# Patient Record
Sex: FEMALE | Race: WHITE | NOT HISPANIC OR LATINO | URBAN - METROPOLITAN AREA
[De-identification: names, ages, dates, MRNs, and addresses within clinical notes are randomized per-mention and may not be internally consistent; named-entity substitution may affect disease eponyms.]

---

## 2020-01-01 ENCOUNTER — INPATIENT (INPATIENT)
Facility: HOSPITAL | Age: 85
LOS: 0 days | DRG: 177 | End: 2020-04-04
Attending: INTERNAL MEDICINE | Admitting: INTERNAL MEDICINE
Payer: MEDICARE

## 2020-01-01 VITALS
SYSTOLIC BLOOD PRESSURE: 100 MMHG | DIASTOLIC BLOOD PRESSURE: 40 MMHG | TEMPERATURE: 98 F | HEART RATE: 90 BPM | OXYGEN SATURATION: 67 % | RESPIRATION RATE: 12 BRPM

## 2020-01-01 VITALS
SYSTOLIC BLOOD PRESSURE: 137 MMHG | HEART RATE: 67 BPM | TEMPERATURE: 98 F | DIASTOLIC BLOOD PRESSURE: 99 MMHG | RESPIRATION RATE: 12 BRPM | OXYGEN SATURATION: 67 %

## 2020-01-01 DIAGNOSIS — Z29.9 ENCOUNTER FOR PROPHYLACTIC MEASURES, UNSPECIFIED: ICD-10-CM

## 2020-01-01 DIAGNOSIS — J18.9 PNEUMONIA, UNSPECIFIED ORGANISM: ICD-10-CM

## 2020-01-01 DIAGNOSIS — Z71.89 OTHER SPECIFIED COUNSELING: ICD-10-CM

## 2020-01-01 DIAGNOSIS — R79.89 OTHER SPECIFIED ABNORMAL FINDINGS OF BLOOD CHEMISTRY: ICD-10-CM

## 2020-01-01 LAB
ALBUMIN SERPL ELPH-MCNC: 2.4 G/DL — LOW (ref 3.5–5)
ALP SERPL-CCNC: 74 U/L — SIGNIFICANT CHANGE UP (ref 40–120)
ALT FLD-CCNC: 15 U/L DA — SIGNIFICANT CHANGE UP (ref 10–60)
ANION GAP SERPL CALC-SCNC: 14 MMOL/L — SIGNIFICANT CHANGE UP (ref 5–17)
APTT BLD: 31.5 SEC — SIGNIFICANT CHANGE UP (ref 27.5–36.3)
AST SERPL-CCNC: 32 U/L — SIGNIFICANT CHANGE UP (ref 10–40)
BASOPHILS # BLD AUTO: 0 K/UL — SIGNIFICANT CHANGE UP (ref 0–0.2)
BASOPHILS NFR BLD AUTO: 0 % — SIGNIFICANT CHANGE UP (ref 0–2)
BILIRUB SERPL-MCNC: 0.4 MG/DL — SIGNIFICANT CHANGE UP (ref 0.2–1.2)
BUN SERPL-MCNC: 128 MG/DL — HIGH (ref 7–18)
CALCIUM SERPL-MCNC: 8.3 MG/DL — LOW (ref 8.4–10.5)
CHLORIDE SERPL-SCNC: 137 MMOL/L — HIGH (ref 96–108)
CO2 SERPL-SCNC: 11 MMOL/L — LOW (ref 22–31)
CREAT SERPL-MCNC: 5.39 MG/DL — HIGH (ref 0.5–1.3)
CRP SERPL-MCNC: 21.27 MG/DL — HIGH (ref 0–0.4)
D DIMER BLD IA.RAPID-MCNC: 9846 NG/ML DDU — HIGH
EOSINOPHIL # BLD AUTO: 0 K/UL — SIGNIFICANT CHANGE UP (ref 0–0.5)
EOSINOPHIL NFR BLD AUTO: 0 % — SIGNIFICANT CHANGE UP (ref 0–6)
ERYTHROCYTE [SEDIMENTATION RATE] IN BLOOD: 81 MM/HR — HIGH (ref 0–20)
FERRITIN SERPL-MCNC: 216 NG/ML — HIGH (ref 15–150)
GLUCOSE SERPL-MCNC: 186 MG/DL — HIGH (ref 70–99)
HCT VFR BLD CALC: 36.8 % — SIGNIFICANT CHANGE UP (ref 34.5–45)
HGB BLD-MCNC: 10.9 G/DL — LOW (ref 11.5–15.5)
INR BLD: 1.07 RATIO — SIGNIFICANT CHANGE UP (ref 0.88–1.16)
LACTATE SERPL-SCNC: 2.1 MMOL/L — HIGH (ref 0.7–2)
LYMPHOCYTES # BLD AUTO: 1.51 K/UL — SIGNIFICANT CHANGE UP (ref 1–3.3)
LYMPHOCYTES # BLD AUTO: 10 % — LOW (ref 13–44)
MCHC RBC-ENTMCNC: 29.6 GM/DL — LOW (ref 32–36)
MCHC RBC-ENTMCNC: 29.9 PG — SIGNIFICANT CHANGE UP (ref 27–34)
MCV RBC AUTO: 100.8 FL — HIGH (ref 80–100)
MONOCYTES # BLD AUTO: 0.45 K/UL — SIGNIFICANT CHANGE UP (ref 0–0.9)
MONOCYTES NFR BLD AUTO: 3 % — SIGNIFICANT CHANGE UP (ref 2–14)
NEUTROPHILS # BLD AUTO: 13.1 K/UL — HIGH (ref 1.8–7.4)
NEUTROPHILS NFR BLD AUTO: 87 % — HIGH (ref 43–77)
PLATELET # BLD AUTO: 298 K/UL — SIGNIFICANT CHANGE UP (ref 150–400)
POTASSIUM SERPL-MCNC: 6.2 MMOL/L — CRITICAL HIGH (ref 3.5–5.3)
POTASSIUM SERPL-SCNC: 6.2 MMOL/L — CRITICAL HIGH (ref 3.5–5.3)
PROCALCITONIN SERPL-MCNC: 0.48 NG/ML — HIGH (ref 0.02–0.1)
PROT SERPL-MCNC: 7.4 G/DL — SIGNIFICANT CHANGE UP (ref 6–8.3)
PROTHROM AB SERPL-ACNC: 12.1 SEC — SIGNIFICANT CHANGE UP (ref 10–12.9)
RBC # BLD: 3.65 M/UL — LOW (ref 3.8–5.2)
RBC # FLD: 17.3 % — HIGH (ref 10.3–14.5)
SODIUM SERPL-SCNC: 162 MMOL/L — CRITICAL HIGH (ref 135–145)
TROPONIN I SERPL-MCNC: 0.09 NG/ML — HIGH (ref 0–0.04)
WBC # BLD: 15.06 K/UL — HIGH (ref 3.8–10.5)
WBC # FLD AUTO: 15.06 K/UL — HIGH (ref 3.8–10.5)

## 2020-01-01 PROCEDURE — 96374 THER/PROPH/DIAG INJ IV PUSH: CPT

## 2020-01-01 PROCEDURE — 86140 C-REACTIVE PROTEIN: CPT

## 2020-01-01 PROCEDURE — 99285 EMERGENCY DEPT VISIT HI MDM: CPT | Mod: CS

## 2020-01-01 PROCEDURE — 87040 BLOOD CULTURE FOR BACTERIA: CPT

## 2020-01-01 PROCEDURE — 85379 FIBRIN DEGRADATION QUANT: CPT

## 2020-01-01 PROCEDURE — 83605 ASSAY OF LACTIC ACID: CPT

## 2020-01-01 PROCEDURE — 85730 THROMBOPLASTIN TIME PARTIAL: CPT

## 2020-01-01 PROCEDURE — 71045 X-RAY EXAM CHEST 1 VIEW: CPT | Mod: 26

## 2020-01-01 PROCEDURE — 71045 X-RAY EXAM CHEST 1 VIEW: CPT

## 2020-01-01 PROCEDURE — 36415 COLL VENOUS BLD VENIPUNCTURE: CPT

## 2020-01-01 PROCEDURE — 82962 GLUCOSE BLOOD TEST: CPT

## 2020-01-01 PROCEDURE — 85610 PROTHROMBIN TIME: CPT

## 2020-01-01 PROCEDURE — 87635 SARS-COV-2 COVID-19 AMP PRB: CPT

## 2020-01-01 PROCEDURE — 84145 PROCALCITONIN (PCT): CPT

## 2020-01-01 PROCEDURE — 93005 ELECTROCARDIOGRAM TRACING: CPT

## 2020-01-01 PROCEDURE — 80053 COMPREHEN METABOLIC PANEL: CPT

## 2020-01-01 PROCEDURE — 85027 COMPLETE CBC AUTOMATED: CPT

## 2020-01-01 PROCEDURE — 82728 ASSAY OF FERRITIN: CPT

## 2020-01-01 PROCEDURE — 96375 TX/PRO/DX INJ NEW DRUG ADDON: CPT

## 2020-01-01 PROCEDURE — 99285 EMERGENCY DEPT VISIT HI MDM: CPT | Mod: 25

## 2020-01-01 PROCEDURE — 84484 ASSAY OF TROPONIN QUANT: CPT

## 2020-01-01 PROCEDURE — 85652 RBC SED RATE AUTOMATED: CPT

## 2020-01-01 RX ORDER — CEFTRIAXONE 500 MG/1
1000 INJECTION, POWDER, FOR SOLUTION INTRAMUSCULAR; INTRAVENOUS ONCE
Refills: 0 | Status: COMPLETED | OUTPATIENT
Start: 2020-01-01 | End: 2020-01-01

## 2020-01-01 RX ORDER — SODIUM CHLORIDE 9 MG/ML
1000 INJECTION INTRAMUSCULAR; INTRAVENOUS; SUBCUTANEOUS ONCE
Refills: 0 | Status: COMPLETED | OUTPATIENT
Start: 2020-01-01 | End: 2020-01-01

## 2020-01-01 RX ORDER — AZITHROMYCIN 500 MG/1
500 TABLET, FILM COATED ORAL ONCE
Refills: 0 | Status: COMPLETED | OUTPATIENT
Start: 2020-01-01 | End: 2020-01-01

## 2020-01-01 RX ADMIN — CEFTRIAXONE 100 MILLIGRAM(S): 500 INJECTION, POWDER, FOR SOLUTION INTRAMUSCULAR; INTRAVENOUS at 06:10

## 2020-01-01 RX ADMIN — SODIUM CHLORIDE 1000 MILLILITER(S): 9 INJECTION INTRAMUSCULAR; INTRAVENOUS; SUBCUTANEOUS at 06:10

## 2020-01-01 RX ADMIN — AZITHROMYCIN 255 MILLIGRAM(S): 500 TABLET, FILM COATED ORAL at 06:10

## 2020-04-04 NOTE — H&P ADULT - PROBLEM SELECTOR PROBLEM 2
Prophylactic measure Pneumonia due to infectious organism, unspecified laterality, unspecified part of lung

## 2020-04-04 NOTE — H&P ADULT - PROBLEM SELECTOR PROBLEM 1
Pneumonia due to infectious organism, unspecified laterality, unspecified part of lung Elevated troponin level

## 2020-04-04 NOTE — H&P ADULT - PROBLEM SELECTOR PLAN 2
- p/w worsening SOB wheezing with dry cough  - PE : B/L expiatory wheezing with rhonchi    - CXR : B/L opacities   - Leukocytosis  - lymphocyte; WNl   - pt is afebrile  - O2 Sat on NRB 67 %   - will start on Rocephin and Azithro to cover for CAP  - will start Thiamine , Vitamin C and Plaquinil   - QTc 410 (Please get repeat EKG every other day)  - Also Will rule out covid 19; testing : contact and airborne isolation precaution , Aspiration Precautions .  - c/w Albuterol Inhaler  - Supportive care , AntPyeretic Tylenol PRN  and anti-tussives Robitussin PRN , Supplemental O2 via nasal cannula.  - f/u flu A  - f/u Procalcitonin  , Legionella Ag , strept , mycoplasma antigen    - f/u Coags , D-Dimers , ESR , CRP , LDH, ferritin , Lactate , T Cell Subset , Cardiac enzymes , G6PD   ** (please get CRP daily and get ESR ,T Cell Subset , D-Dimers , LDH, ferritin , Cardiac enzymes , Coags every 3 days if COVID +ve )

## 2020-04-04 NOTE — DISCHARGE NOTE FOR THE EXPIRED PATIENT - HOSPITAL COURSE
89 year old female DNR/DNI PMH dementia, CAD, PVD, OA, HTN, HLD, bedsores coming in from NH for resp distress. no further history obtained from patient 2/2 condition. NKDA  patient  due to hypoxic respiratory failure 89 year old female DNR/DNI PMH dementia, CAD, PVD, OA, HTN, HLD, bedsores coming in from NH for resp distress. no further history obtained from patient 2/2 condition. NKDA . patient  due to hypoxic respiratory failure At pt was non responsive to verbal commands or physical stimuli, carotid and radial pulse were absent, pupils were dilated and fixed, no heart sounds were present on ascultation, announced patient dead at 12:14 pm on 2020. Family was informed. Family was not able to be reached regarding autopsy.   For full account of hospital course please refer to actual medical records. As this is a brief summery.

## 2020-04-04 NOTE — ED ADULT NURSE NOTE - OBJECTIVE STATEMENT
Pt brought in by EMS notification for respiratory distress. Pt already on non-rebreather saturating in 70-80s and tachypnic in the 30s. pt is nonverbal and cant provide any info. Pt has contracted toes with multiple gangrene.

## 2020-04-04 NOTE — H&P ADULT - HISTORY OF PRESENT ILLNESS
89 year old female DNR/DNI PMH dementia, CAD, PVD, OA, HTN, HLD, bedsores coming in from NH for resp distress. no further history obtained from patient 2/2 condition. NKDA    In ED :   PE : B/L expiatory wheezing with rhonchi    CXR : B/L opacities   Leukocytosis  lymphocyte; WNl   pt is afebrile  O2 Sat on NRB 67 %   ECG: Sinus Tachy No ST-T wave changes appreciated    Troponin I 1 0.085     Pt is DNR/DNI

## 2020-04-04 NOTE — ED ADULT NURSE NOTE - NSIMPLEMENTINTERV_GEN_ALL_ED
Implemented All Fall Risk Interventions:  [place] to call system. Call bell, personal items and telephone within reach. Instruct patient to call for assistance. Room bathroom lighting operational. Non-slip footwear when patient is off stretcher. Physically safe environment: no spills, clutter or unnecessary equipment. Stretcher in lowest position, wheels locked, appropriate side rails in place. Provide visual cue, wrist band, yellow gown, etc. Monitor gait and stability. Monitor for mental status changes and reorient to person, place, and time. Review medications for side effects contributing to fall risk. Reinforce activity limits and safety measures with patient and family.

## 2020-04-04 NOTE — H&P ADULT - NSHPPHYSICALEXAM_GEN_ALL_CORE
Vital Signs Last 24 Hrs  T(C): 36.7 (04 Apr 2020 07:00), Max: 36.7 (04 Apr 2020 07:00)  T(F): 98 (04 Apr 2020 07:00), Max: 98 (04 Apr 2020 07:00)  HR: 90 (04 Apr 2020 07:00) (67 - 90)  BP: 100/40 (04 Apr 2020 07:00) (100/40 - 137/99)  BP(mean): --  RR: 12 (04 Apr 2020 07:00) (12 - 12)  SpO2: 67% (04 Apr 2020 07:00) (67% - 67%)        PHYSICAL EXAM:   · Appearance: ILL APPEARING  · Development: POORLY DEVELOPED  · Mentation: ALTERED LOC  · Nourishment: POOR  · EYES: Clear bilaterally, pupils equal, round and reactive to light.  · CARDIAC: Normal rate, regular rhythm.  Heart sounds S1, S2.  No murmurs, rubs or gallops.  · Respiratory Distress: YES  · Breath Sounds: RHONCHI  · Rhonchi: DIFFUSE  · GASTROINTESTINAL: Abdomen soft, non-tender, no guarding. Vital Signs Last 24 Hrs  T(C): 36.7 (04 Apr 2020 07:00), Max: 36.7 (04 Apr 2020 07:00)  T(F): 98 (04 Apr 2020 07:00), Max: 98 (04 Apr 2020 07:00)  HR: 90 (04 Apr 2020 07:00) (67 - 90)  BP: 100/40 (04 Apr 2020 07:00) (100/40 - 137/99)  BP(mean): --  RR: 12 (04 Apr 2020 07:00) (12 - 12)  SpO2: 67% (04 Apr 2020 07:00) (67% - 67%)        PHYSICAL EXAM:   · Appearance: ILL APPEARING  · Development: POORLY DEVELOPED  · Mentation: ALTERED LOC  · Nourishment: POOR  · EYES: Clear bilaterally, pupils equal, round and reactive to light.  · CARDIAC: Normal rate, regular rhythm.  Heart sounds S1, S2.  No murmurs, rubs or gallops.  · Respiratory Distress: YES  · Breath Sounds: RHONCHI  · Rhonchi: DIFFUSE  · GASTROINTESTINAL: Abdomen soft, non-tender, no guarding.  . NEURO : [id number]

## 2020-04-04 NOTE — ED ADULT NURSE REASSESSMENT NOTE - NS ED NURSE REASSESS COMMENT FT1
0700 = received pt on NRM ,tachypneic admitted to medicine awaits bed assign . on DNR/DNI status.   1155 = noted unresponsive , no spontaneous respiration ,, pulseless . no VS appreciated . Md Sousa made aware , states will see pt . 0700 = received pt on NRM ,tachypneic admitted to medicine awaits bed assign . on DNR/DNI status.   1155 = noted unresponsive , no spontaneous respiration ,, pulseless . no VS appreciated . Md Sousa made aware , states will see pt .  1214= seen by Md Calabrese pronounce pt dead

## 2020-04-04 NOTE — H&P ADULT - PROBLEM SELECTOR PLAN 1
- p/w SOB found to have elevated trops   - ECG: Sinus Tachy No ST-T wave changes appreciated    - Troponin I 1 0.085   - f/u T2 , T3   - c/w ASA 81 mg, beta blocker and High dose statin   - Follow up TSH, HbA1C, Lipid profile, TTE  - On Tele  ** Cardiologist Dr. Rothman

## 2020-04-04 NOTE — H&P ADULT - ASSESSMENT
89 [place] female DNR/DNI PMH dementia, CAD, PVD, OA, HTN, HLD, bedsores coming in from NH for resp distress. no further history obtained from patient 2/2 condition.    In ED :       Pt is admitted to r/o COVID 89 year old female DNR/DNI PMH dementia, CAD, PVD, OA, HTN, HLD, bedsores coming in from NH for resp distress. no further history obtained from patient 2/2 condition. NKDA    In ED :   PE : B/L expiatory wheezing with rhonchi    CXR : B/L opacities   Leukocytosis  lymphocyte; WNl   pt is afebrile  O2 Sat on NRB 67 %   ECG: Sinus Tachy No ST-T wave changes appreciated    Troponin I 1 0.085     Pt is DNR/DNI    Pt is admitted to r/o [facility]

## 2020-04-04 NOTE — ED PROVIDER NOTE - OBJECTIVE STATEMENT
89 [place] female DNR/DNI PMH dementia, CAD, PVD, OA, HTN, HLD, bedsores coming in from NH for resp distress. no further history obtained from patient 2/2 condition.

## 2020-04-05 LAB — SARS-COV-2 RNA SPEC QL NAA+PROBE: DETECTED

## 2020-04-08 LAB
CULTURE RESULTS: SIGNIFICANT CHANGE UP
CULTURE RESULTS: SIGNIFICANT CHANGE UP
SPECIMEN SOURCE: SIGNIFICANT CHANGE UP
SPECIMEN SOURCE: SIGNIFICANT CHANGE UP